# Patient Record
Sex: MALE | Race: WHITE | NOT HISPANIC OR LATINO | Employment: UNEMPLOYED
[De-identification: names, ages, dates, MRNs, and addresses within clinical notes are randomized per-mention and may not be internally consistent; named-entity substitution may affect disease eponyms.]

---

## 2020-02-08 ENCOUNTER — HEALTH MAINTENANCE LETTER (OUTPATIENT)
Age: 50
End: 2020-02-08

## 2020-11-07 ENCOUNTER — HEALTH MAINTENANCE LETTER (OUTPATIENT)
Age: 50
End: 2020-11-07

## 2021-03-27 ENCOUNTER — HEALTH MAINTENANCE LETTER (OUTPATIENT)
Age: 51
End: 2021-03-27

## 2021-09-11 ENCOUNTER — HEALTH MAINTENANCE LETTER (OUTPATIENT)
Age: 51
End: 2021-09-11

## 2022-02-16 ENCOUNTER — OFFICE VISIT (OUTPATIENT)
Dept: URGENT CARE | Facility: URGENT CARE | Age: 52
End: 2022-02-16
Payer: COMMERCIAL

## 2022-02-16 VITALS
OXYGEN SATURATION: 98 % | TEMPERATURE: 98.4 F | HEART RATE: 63 BPM | SYSTOLIC BLOOD PRESSURE: 123 MMHG | DIASTOLIC BLOOD PRESSURE: 72 MMHG

## 2022-02-16 DIAGNOSIS — N39.0 URINARY TRACT INFECTION WITHOUT HEMATURIA, SITE UNSPECIFIED: ICD-10-CM

## 2022-02-16 DIAGNOSIS — R30.0 DYSURIA: Primary | ICD-10-CM

## 2022-02-16 LAB
ALBUMIN UR-MCNC: 30 MG/DL
APPEARANCE UR: CLEAR
BACTERIA #/AREA URNS HPF: ABNORMAL /HPF
BILIRUB UR QL STRIP: NEGATIVE
COLOR UR AUTO: YELLOW
GLUCOSE UR STRIP-MCNC: NEGATIVE MG/DL
HGB UR QL STRIP: ABNORMAL
KETONES UR STRIP-MCNC: ABNORMAL MG/DL
LEUKOCYTE ESTERASE UR QL STRIP: ABNORMAL
NITRATE UR QL: POSITIVE
PH UR STRIP: 7.5 [PH] (ref 5–7)
RBC #/AREA URNS AUTO: ABNORMAL /HPF
SP GR UR STRIP: 1.02 (ref 1–1.03)
SQUAMOUS #/AREA URNS AUTO: ABNORMAL /LPF
UROBILINOGEN UR STRIP-ACNC: 0.2 E.U./DL
WBC #/AREA URNS AUTO: ABNORMAL /HPF
WBC CLUMPS #/AREA URNS HPF: PRESENT /HPF

## 2022-02-16 PROCEDURE — 87086 URINE CULTURE/COLONY COUNT: CPT | Performed by: FAMILY MEDICINE

## 2022-02-16 PROCEDURE — 99203 OFFICE O/P NEW LOW 30 MIN: CPT | Performed by: FAMILY MEDICINE

## 2022-02-16 PROCEDURE — 81001 URINALYSIS AUTO W/SCOPE: CPT

## 2022-02-16 PROCEDURE — 87088 URINE BACTERIA CULTURE: CPT | Performed by: FAMILY MEDICINE

## 2022-02-16 PROCEDURE — 87186 SC STD MICRODIL/AGAR DIL: CPT | Performed by: FAMILY MEDICINE

## 2022-02-16 RX ORDER — BUPRENORPHINE HYDROCHLORIDE AND NALOXONE HYDROCHLORIDE DIHYDRATE 2; .5 MG/1; MG/1
1 TABLET SUBLINGUAL 2 TIMES DAILY
COMMUNITY

## 2022-02-16 RX ORDER — SULFAMETHOXAZOLE/TRIMETHOPRIM 800-160 MG
1 TABLET ORAL 2 TIMES DAILY
Qty: 14 TABLET | Refills: 0 | Status: SHIPPED | OUTPATIENT
Start: 2022-02-16 | End: 2022-02-23

## 2022-02-16 NOTE — PROGRESS NOTES
SUBJECTIVE:   Israel Sanabria is a 51 year old male who  presents today for a possible UTI. Symptoms of dysuria, urgency, frequency and discharge have been going on for 3day(s).  Hematuria no.  sudden onset and worsening and moderate.  There is no history of fever, chills.  No concerns for STD, declined screen. This patient does not have a history of urinary tract infections.     Initially thought that he had a kidney stone but denies any significant pain.  BM normal, no rectal pain.    Past Medical History:   Diagnosis Date     Chronic pain     neck and shoulders     Depressive disorder      Substance abuse (H)      Current Outpatient Medications   Medication Sig Dispense Refill     buprenorphine-naloxone (SUBOXONE) 2-0.5 MG SUBL sublingual tablet Place 1 tablet under the tongue 2 times daily       cloNIDine (CATAPRES) 0.1 MG tablet Take 1 tablet (0.1 mg) by mouth 3 times daily for 7 days 21 tablet 0     Ketotifen Fumarate (EYE ITCH RELIEF OP) Apply to eye as needed (Patient not taking: Reported on 2022)       Social History     Tobacco Use     Smoking status: Former Smoker     Packs/day: 0.25     Quit date: 2014     Years since quittin.8     Smokeless tobacco: Never Used   Substance Use Topics     Alcohol use: No       ROS:   Review of systems negative except as stated above.    OBJECTIVE:  /72   Pulse 63   Temp 98.4  F (36.9  C) (Tympanic)   SpO2 98%   GENERAL APPEARANCE: healthy, alert and no distress  PSYCH: mentation appears normal and affect normal/bright    Results for orders placed or performed in visit on 22   UA reflex to Microscopic and Culture     Status: Abnormal    Specimen: Urine, Clean Catch   Result Value Ref Range    Color Urine Yellow Colorless, Straw, Light Yellow, Yellow    Appearance Urine Clear Clear    Glucose Urine Negative Negative mg/dL    Bilirubin Urine Negative Negative    Ketones Urine Trace (A) Negative mg/dL    Specific Gravity Urine 1.020 1.003 - 1.035     Blood Urine Trace (A) Negative    pH Urine 7.5 (H) 5.0 - 7.0    Protein Albumin Urine 30  (A) Negative mg/dL    Urobilinogen Urine 0.2 0.2, 1.0 E.U./dL    Nitrite Urine Positive (A) Negative    Leukocyte Esterase Urine Moderate (A) Negative   Urine Microscopic Exam     Status: Abnormal   Result Value Ref Range    Bacteria Urine Many (A) None Seen /HPF    RBC Urine 2-5 (A) 0-2 /HPF /HPF    WBC Urine 25-50 (A) 0-5 /HPF /HPF    Squamous Epithelials Urine Few (A) None Seen /LPF    WBC Clumps Urine Present (A) None Seen /HPF       ASSESSMENT/PLAN:   (R30.0) Dysuria  (primary encounter diagnosis)  Plan: UA reflex to Microscopic and Culture, Urine         Microscopic Exam, Urine Culture            (N39.0) Urinary tract infection without hematuria, site unspecified  Plan: sulfamethoxazole-trimethoprim (BACTRIM DS)         800-160 MG tablet            Empiric treatment for presumptive UTI with RX Bactrim DS, will follow up on urine culture and adjust medication if needed.  Drink plenty of fluids.  Signs and symptoms of pyelonephritis mentioned.  Reviewed that may need to have PCP evaluate prostate as potential etiology.    Follow up with PCP in 1-2 weeks if no resolution of symptoms.    Kvng Son MD  February 16, 2022 2:42 PM

## 2022-02-16 NOTE — LETTER
February 16, 2022      Israel Sanabria  [address]  [place] [address]        To Whom It May Concern:    Israel Sanabria  was seen on 2/16.  Please excuse him from work 2/16 due to illness.        Sincerely,        Kvng Son MD

## 2022-02-18 LAB — BACTERIA UR CULT: ABNORMAL

## 2022-03-06 ENCOUNTER — OFFICE VISIT (OUTPATIENT)
Dept: URGENT CARE | Facility: URGENT CARE | Age: 52
End: 2022-03-06
Payer: COMMERCIAL

## 2022-03-06 VITALS
OXYGEN SATURATION: 99 % | DIASTOLIC BLOOD PRESSURE: 87 MMHG | TEMPERATURE: 98.3 F | BODY MASS INDEX: 24.8 KG/M2 | RESPIRATION RATE: 17 BRPM | WEIGHT: 177.8 LBS | SYSTOLIC BLOOD PRESSURE: 127 MMHG | HEART RATE: 66 BPM

## 2022-03-06 DIAGNOSIS — N39.0 URINARY TRACT INFECTION WITHOUT HEMATURIA, SITE UNSPECIFIED: Primary | ICD-10-CM

## 2022-03-06 DIAGNOSIS — R36.9 PENILE DISCHARGE: ICD-10-CM

## 2022-03-06 DIAGNOSIS — R39.198 DECREASED URINE STREAM: ICD-10-CM

## 2022-03-06 LAB
ALBUMIN UR-MCNC: ABNORMAL MG/DL
APPEARANCE UR: ABNORMAL
BACTERIA #/AREA URNS HPF: ABNORMAL /HPF
BASOPHILS # BLD AUTO: 0 10E3/UL (ref 0–0.2)
BASOPHILS NFR BLD AUTO: 1 %
BILIRUB UR QL STRIP: NEGATIVE
COLOR UR AUTO: YELLOW
EOSINOPHIL # BLD AUTO: 0.2 10E3/UL (ref 0–0.7)
EOSINOPHIL NFR BLD AUTO: 3 %
GLUCOSE UR STRIP-MCNC: NEGATIVE MG/DL
HGB UR QL STRIP: ABNORMAL
KETONES UR STRIP-MCNC: NEGATIVE MG/DL
LEUKOCYTE ESTERASE UR QL STRIP: ABNORMAL
LYMPHOCYTES # BLD AUTO: 2.1 10E3/UL (ref 0.8–5.3)
LYMPHOCYTES NFR BLD AUTO: 27 %
MONOCYTES # BLD AUTO: 0.7 10E3/UL (ref 0–1.3)
MONOCYTES NFR BLD AUTO: 9 %
NEUTROPHILS # BLD AUTO: 4.6 10E3/UL (ref 1.6–8.3)
NEUTROPHILS NFR BLD AUTO: 61 %
NITRATE UR QL: POSITIVE
PH UR STRIP: 7 [PH] (ref 5–7)
RBC #/AREA URNS AUTO: ABNORMAL /HPF
SP GR UR STRIP: 1.02 (ref 1–1.03)
UROBILINOGEN UR STRIP-ACNC: 0.2 E.U./DL
WBC # BLD AUTO: 7.7 10E3/UL (ref 4–11)
WBC #/AREA URNS AUTO: ABNORMAL /HPF

## 2022-03-06 PROCEDURE — G0103 PSA SCREENING: HCPCS | Performed by: PHYSICIAN ASSISTANT

## 2022-03-06 PROCEDURE — 81001 URINALYSIS AUTO W/SCOPE: CPT | Performed by: PHYSICIAN ASSISTANT

## 2022-03-06 PROCEDURE — 87088 URINE BACTERIA CULTURE: CPT | Performed by: PHYSICIAN ASSISTANT

## 2022-03-06 PROCEDURE — 87491 CHLMYD TRACH DNA AMP PROBE: CPT | Performed by: PHYSICIAN ASSISTANT

## 2022-03-06 PROCEDURE — 87186 SC STD MICRODIL/AGAR DIL: CPT | Performed by: PHYSICIAN ASSISTANT

## 2022-03-06 PROCEDURE — 85004 AUTOMATED DIFF WBC COUNT: CPT | Performed by: PHYSICIAN ASSISTANT

## 2022-03-06 PROCEDURE — 36415 COLL VENOUS BLD VENIPUNCTURE: CPT | Performed by: PHYSICIAN ASSISTANT

## 2022-03-06 PROCEDURE — 87086 URINE CULTURE/COLONY COUNT: CPT | Performed by: PHYSICIAN ASSISTANT

## 2022-03-06 PROCEDURE — 85048 AUTOMATED LEUKOCYTE COUNT: CPT | Performed by: PHYSICIAN ASSISTANT

## 2022-03-06 PROCEDURE — 99213 OFFICE O/P EST LOW 20 MIN: CPT | Performed by: PHYSICIAN ASSISTANT

## 2022-03-06 PROCEDURE — 87591 N.GONORRHOEAE DNA AMP PROB: CPT | Performed by: PHYSICIAN ASSISTANT

## 2022-03-06 RX ORDER — SULFAMETHOXAZOLE/TRIMETHOPRIM 800-160 MG
1 TABLET ORAL 2 TIMES DAILY
Qty: 14 TABLET | Refills: 0 | Status: SHIPPED | OUTPATIENT
Start: 2022-03-06 | End: 2022-03-13

## 2022-03-06 NOTE — PROGRESS NOTES
SUBJECTIVE:   Israel Sanabria is a 51 year old male who  presents today for a possible UTI. Symptoms of dysuria, frequency, burning and suprapubic pain and pressure have been going on for 2day(s).  Hematuria no.  gradual onsetand moderate.  There is no history of fever, chills, nausea or vomiting.  Does have some penile discharge he states but no risk for STD. This patient does  have a history of urinary tract infections and last treated about 3 weeks ago.  Sx resolved but came back again. Patient denies long duration, rigors, flank pain, temperature > 101 degrees F., Vomiting, significant nausea or diarrhea, taking Coumadin and GFR less than 30 within the last year  Also worried about his PSA and would like checked     Past Medical History:   Diagnosis Date     Chronic pain     neck and shoulders     Depressive disorder      Substance abuse (H)      Current Outpatient Medications   Medication Sig Dispense Refill     buprenorphine-naloxone (SUBOXONE) 2-0.5 MG SUBL sublingual tablet Place 1 tablet under the tongue 2 times daily       Ketotifen Fumarate (EYE ITCH RELIEF OP) Apply to eye as needed        cloNIDine (CATAPRES) 0.1 MG tablet Take 1 tablet (0.1 mg) by mouth 3 times daily for 7 days 21 tablet 0     Social History     Tobacco Use     Smoking status: Former Smoker     Packs/day: 0.25     Quit date: 2014     Years since quittin.8     Smokeless tobacco: Never Used   Substance Use Topics     Alcohol use: No       ROS:   Review of systems negative except as stated above.    OBJECTIVE:  /87   Pulse 66   Temp 98.3  F (36.8  C)   Resp 17   Wt 80.6 kg (177 lb 12.8 oz)   SpO2 99%   BMI 24.80 kg/m    GENERAL APPEARANCE: healthy, alert and no distress  RESP: lungs clear to auscultation - no rales, rhonchi or wheezes  CV: regular rates and rhythm, normal S1 S2, no murmur noted  ABDOMEN:  soft, nontender, no HSM or masses and bowel sounds normal  BACK: No CVA tenderness  GU_male: declines   SKIN: no  suspicious lesions or rashes    Results for orders placed or performed in visit on 03/06/22   UA macro with reflex to Microscopic and Culture - Clinc Collect     Status: Abnormal    Specimen: Urine, Clean Catch   Result Value Ref Range    Color Urine Yellow Colorless, Straw, Light Yellow, Yellow    Appearance Urine Slightly Cloudy (A) Clear    Glucose Urine Negative Negative mg/dL    Bilirubin Urine Negative Negative    Ketones Urine Negative Negative mg/dL    Specific Gravity Urine 1.020 1.003 - 1.035    Blood Urine Moderate (A) Negative    pH Urine 7.0 5.0 - 7.0    Protein Albumin Urine Trace (A) Negative mg/dL    Urobilinogen Urine 0.2 0.2, 1.0 E.U./dL    Nitrite Urine Positive (A) Negative    Leukocyte Esterase Urine Moderate (A) Negative   Urine Microscopic Exam     Status: Abnormal   Result Value Ref Range    Bacteria Urine Many (A) None Seen /HPF    RBC Urine 10-25 (A) 0-2 /HPF /HPF    WBC Urine 25-50 (A) 0-5 /HPF /HPF   PSA, screen     Status: Normal   Result Value Ref Range    Prostate Specific Antigen Screen 0.79 0.00 - 4.00 ug/L   WBC and Differential     Status: None   Result Value Ref Range    WBC Count 7.7 4.0 - 11.0 10e3/uL    % Neutrophils 61 %    % Lymphocytes 27 %    % Monocytes 9 %    % Eosinophils 3 %    % Basophils 1 %    Absolute Neutrophils 4.6 1.6 - 8.3 10e3/uL    Absolute Lymphocytes 2.1 0.8 - 5.3 10e3/uL    Absolute Monocytes 0.7 0.0 - 1.3 10e3/uL    Absolute Eosinophils 0.2 0.0 - 0.7 10e3/uL    Absolute Basophils 0.0 0.0 - 0.2 10e3/uL   Neisseria gonorrhoeae PCR - Clinic Collect     Status: Normal    Specimen: Urine, Voided   Result Value Ref Range    Neisseria gonorrhoeae Negative Negative   Chlamydia trachomatis PCR - Clinic Collect     Status: Normal    Specimen: Urine, Voided   Result Value Ref Range    Chlamydia trachomatis Negative Negative   Urine Culture     Status: Abnormal    Specimen: Urine, Clean Catch   Result Value Ref Range    Culture >100,000 CFU/mL Staphylococcus aureus  (A)        Susceptibility    Staphylococcus aureus - VALERIE     Oxacillin* 0.5 Susceptible ug/mL      * Oxacillin susceptible isolates are susceptible to cephalosporins (example: cefazolin and cephalexin) and beta lactam combination agents. Oxacillin resistant isolates are resistant to these agents.     Gentamicin <=0.5 Susceptible ug/mL     Vancomycin 1.0 Susceptible ug/mL     Tetracycline <=1.0 Susceptible ug/mL     Nitrofurantoin <=16.0 Susceptible ug/mL     Trimethoprim/Sulfamethoxazole <=0.5/9.5 Susceptible ug/mL   WBC with Diff     Status: None    Narrative    The following orders were created for panel order WBC with Diff.  Procedure                               Abnormality         Status                     ---------                               -----------         ------                     WBC and Differential[[id number]]                             Final result                 Please view results for these tests on the individual orders.         ASSESSMENT:   Lower, uncomplicated urinary tract infection.    PLAN:    Culture pending  Bactrim as directed   Drink plenty of fluids.  Prevention and treatment of UTI's discussed.Signs and symptoms of pyelonephritis mentioned.  Follow up with primary care physician if not improving

## 2022-03-07 LAB — PSA SERPL-MCNC: 0.79 UG/L (ref 0–4)

## 2022-03-08 LAB
C TRACH DNA SPEC QL NAA+PROBE: NEGATIVE
N GONORRHOEA DNA SPEC QL NAA+PROBE: NEGATIVE

## 2022-03-09 LAB — BACTERIA UR CULT: ABNORMAL

## 2022-04-17 ENCOUNTER — HEALTH MAINTENANCE LETTER (OUTPATIENT)
Age: 52
End: 2022-04-17

## 2022-10-29 ENCOUNTER — HEALTH MAINTENANCE LETTER (OUTPATIENT)
Age: 52
End: 2022-10-29

## 2023-04-02 ENCOUNTER — OFFICE VISIT (OUTPATIENT)
Dept: FAMILY MEDICINE | Facility: CLINIC | Age: 53
End: 2023-04-02
Payer: COMMERCIAL

## 2023-04-02 VITALS
HEART RATE: 73 BPM | OXYGEN SATURATION: 97 % | DIASTOLIC BLOOD PRESSURE: 86 MMHG | TEMPERATURE: 98.4 F | SYSTOLIC BLOOD PRESSURE: 148 MMHG

## 2023-04-02 DIAGNOSIS — K04.7 TOOTH INFECTION: Primary | ICD-10-CM

## 2023-04-02 PROCEDURE — 99213 OFFICE O/P EST LOW 20 MIN: CPT | Performed by: FAMILY MEDICINE

## 2023-04-02 RX ORDER — CLINDAMYCIN HCL 300 MG
300 CAPSULE ORAL 4 TIMES DAILY
Qty: 30 CAPSULE | Refills: 0 | Status: SHIPPED | OUTPATIENT
Start: 2023-04-02

## 2023-04-02 NOTE — PROGRESS NOTES
SUBJECTIVE:   Israel Sanabria is a 52 year old male presenting with a chief complaint of tooth pain  Tooth has a crown on it- pain started a couple of days. Patient reports a funny taste in his mouth. Has had no systemic symptoms.   Chief Complaint   Patient presents with     Dental Pain     Tooth pain started about 2-3 days ago progressively worse.        He is an established patient of [facility].    Dental pain    Onset of symptoms was 3 day(s) ago.  Course of illness is worsening.    Severity moderate  Current and Associated symptoms: swollen gums, pain in the tooth  Treatment measures tried include Tylenol/Ibuprofen.  Predisposing factors include None.                Review of Systems   Constitutional: Negative.    HENT: Positive for dental problem.    Eyes: Negative.    Respiratory: Negative.    Cardiovascular: Negative.    Gastrointestinal: Negative.    Endocrine: Negative.    Genitourinary: Negative.    Musculoskeletal: Negative.    Skin: Negative.    Allergic/Immunologic: Negative.    Neurological: Negative.    Hematological: Negative.    Psychiatric/Behavioral: Negative.        Past Medical History:   Diagnosis Date     Chronic pain     neck and shoulders     Depressive disorder      Substance abuse (H)      Family History   Problem Relation Age of Onset     C.A.D. Mother         not diagnosed     Heart Disease Mother         not diagnosed     C.A.D. Paternal Grandmother      Heart Disease Paternal Grandmother      Hypertension Mother      Cerebrovascular Disease Mother      Cerebrovascular Disease Paternal Grandmother      Alzheimer Disease Paternal Grandmother      Alzheimer Disease Paternal Grandfather      Arthritis Paternal Grandmother      Circulatory Paternal Grandmother      Lipids Mother         not diagnosed     Lipids Father      Musculoskeletal Disorder Father      Musculoskeletal Disorder Sister      Obesity Mother      Current Outpatient Medications   Medication Sig Dispense Refill      buprenorphine-naloxone (SUBOXONE) 2-0.5 MG SUBL sublingual tablet Place 1 tablet under the tongue 2 times daily       cloNIDine (CATAPRES) 0.1 MG tablet Take 1 tablet (0.1 mg) by mouth 3 times daily for 7 days 21 tablet 0     Ketotifen Fumarate (EYE ITCH RELIEF OP) Apply to eye as needed  (Patient not taking: Reported on 2023)       Social History     Tobacco Use     Smoking status: Former     Packs/day: 0.25     Types: Cigarettes     Quit date: 2014     Years since quittin.9     Smokeless tobacco: Never   Vaping Use     Vaping status: Not on file   Substance Use Topics     Alcohol use: No       OBJECTIVE  BP (!) 148/86   Pulse 73   Temp 98.4  F (36.9  C)   SpO2 97%     Physical Exam  Constitutional:       Appearance: Normal appearance.   HENT:      Mouth/Throat:      Dentition: Abnormal dentition. Dental tenderness, gingival swelling, dental caries and dental abscesses present.        Comments: Lower left molar  Cardiovascular:      Rate and Rhythm: Normal rate and regular rhythm.      Pulses: Normal pulses.      Heart sounds: Normal heart sounds.   Pulmonary:      Effort: Pulmonary effort is normal. No respiratory distress.      Breath sounds: Normal breath sounds. No stridor. No wheezing, rhonchi or rales.   Chest:      Chest wall: No tenderness.   Neurological:      Mental Status: He is alert.         Labs:  No results found for this or any previous visit (from the past 24 hour(s)).    X-Ray was not done.    ASSESSMENT:      ICD-10-CM    1. Tooth infection  K04.7 clindamycin (CLEOCIN) 300 MG capsule           Medical Decision Making:        Serious Comorbid Conditions:  Adult:  None    PLAN:    Dental , Adult:  Tylenol, Ibuprofen, Fluids and Rest    Followup:    If not improving or if condition worsens, follow up with your Primary Care Provider    There are no Patient Instructions on file for this visit.

## 2023-04-04 ASSESSMENT — ENCOUNTER SYMPTOMS
GASTROINTESTINAL NEGATIVE: 1
EYES NEGATIVE: 1
ALLERGIC/IMMUNOLOGIC NEGATIVE: 1
PSYCHIATRIC NEGATIVE: 1
ENDOCRINE NEGATIVE: 1
RESPIRATORY NEGATIVE: 1
NEUROLOGICAL NEGATIVE: 1
HEMATOLOGIC/LYMPHATIC NEGATIVE: 1
CONSTITUTIONAL NEGATIVE: 1
CARDIOVASCULAR NEGATIVE: 1
MUSCULOSKELETAL NEGATIVE: 1

## 2023-06-01 ENCOUNTER — HEALTH MAINTENANCE LETTER (OUTPATIENT)
Age: 53
End: 2023-06-01

## 2023-06-16 ENCOUNTER — OFFICE VISIT (OUTPATIENT)
Dept: FAMILY MEDICINE | Facility: CLINIC | Age: 53
End: 2023-06-16
Payer: COMMERCIAL

## 2023-06-16 VITALS
WEIGHT: 176 LBS | HEART RATE: 74 BPM | RESPIRATION RATE: 16 BRPM | OXYGEN SATURATION: 98 % | BODY MASS INDEX: 24.55 KG/M2 | DIASTOLIC BLOOD PRESSURE: 74 MMHG | TEMPERATURE: 98.3 F | SYSTOLIC BLOOD PRESSURE: 109 MMHG

## 2023-06-16 DIAGNOSIS — K08.89 PAIN, DENTAL: Primary | ICD-10-CM

## 2023-06-16 PROCEDURE — 99213 OFFICE O/P EST LOW 20 MIN: CPT | Performed by: NURSE PRACTITIONER

## 2023-06-16 RX ORDER — CHLORHEXIDINE GLUCONATE ORAL RINSE 1.2 MG/ML
SOLUTION DENTAL
Qty: 473 ML | Refills: 0 | Status: SHIPPED | OUTPATIENT
Start: 2023-06-16

## 2023-06-16 ASSESSMENT — ENCOUNTER SYMPTOMS
CHILLS: 0
FEVER: 0

## 2023-06-17 NOTE — PATIENT INSTRUCTIONS
Ibuprofen as needed.  Recommend 600 mg 3-4 times daily.    Try chlorhexidine rinse -teeth in the morning and then to rinse 2 additional times daily.  Do this until your symptoms resolve.    Come back with facial swelling or fevers.    It is not good to be on clindamycin frequently.  You last had clindamycin in April.    See a dentist as soon as possible.

## 2023-06-17 NOTE — PROGRESS NOTES
Assessment & Plan     Pain, dental    - chlorhexidine (PERIDEX) 0.12 % solution  Dispense: 473 mL; Refill: 0    Patient with dental pain with chewing for the last 2 to 3 days.  No obvious tenderness on exam.  He does have a little gum recession in the area.  No obvious visible abscess seen.  No facial swelling.  Has recently been on clindamycin last time in April for dental pain.    We will try chlorhexidine rinses twice daily until symptoms resolve. ibuprofen as needed.    Offered emergency dental clinic referral.  Patient states they do not take his insurance.  Says he does have private insurance and cannot find a dentist easily.  Does not want any extractions.    Urged him to keep trying to find a dentist.  Recheck if facial swelling, fevers, worse.    Angry with plan and would like clindamycin.  Explained drug-resistant infection or C. Difficile not appropriate to be on it every 2 months and there are more causes to dental pain than simply infections.  Explained antibiotic rinse can help to prevent dental infections and reduce dental pain.              Return for If no better.    Silvina Jorge, [facility]    Eloise Wood is a 52 year old male who presents to clinic today for the following health issues:  Chief Complaint   Patient presents with     Dental Problem     Lower Lt back tooth x 2-3 day. Pain when chew.     HPI    Dental pain lower left lower tooth with chewing.  States he had a infection in this tooth previously.  Has a history of amoxicillin reaction.  Cannot find a dentist.  Was on clindamycin back in April.        Review of Systems   Constitutional: Negative for chills and fever.           Objective    /74   Pulse 74   Temp 98.3  F (36.8  C) (Oral)   Resp 16   Wt 79.8 kg (176 lb)   SpO2 98%   BMI 24.55 kg/m    Physical Exam  Constitutional:       Appearance: He is well-developed. He is not ill-appearing.   HENT:      Mouth/Throat:      Dentition:  Dental caries present. No dental abscesses or gum lesions.        Comments: Painful tooth.  Gum recession.  Nontender.  Eyes:      General:         Right eye: No discharge.         Left eye: No discharge.      Conjunctiva/sclera: Conjunctivae normal.   Pulmonary:      Effort: Pulmonary effort is normal.   Musculoskeletal:         General: Normal range of motion.   Skin:     General: Skin is warm.   Neurological:      Mental Status: He is alert and oriented to person, place, and time.   Psychiatric:         Mood and Affect: Mood normal.         Behavior: Behavior normal.         Thought Content: Thought content normal.         Judgment: Judgment normal.

## 2023-07-14 ENCOUNTER — ANCILLARY PROCEDURE (OUTPATIENT)
Dept: GENERAL RADIOLOGY | Facility: CLINIC | Age: 53
End: 2023-07-14
Attending: PHYSICIAN ASSISTANT
Payer: COMMERCIAL

## 2023-07-14 ENCOUNTER — OFFICE VISIT (OUTPATIENT)
Dept: URGENT CARE | Facility: URGENT CARE | Age: 53
End: 2023-07-14
Payer: COMMERCIAL

## 2023-07-14 VITALS
RESPIRATION RATE: 20 BRPM | DIASTOLIC BLOOD PRESSURE: 85 MMHG | HEART RATE: 74 BPM | TEMPERATURE: 98 F | OXYGEN SATURATION: 98 % | SYSTOLIC BLOOD PRESSURE: 135 MMHG

## 2023-07-14 DIAGNOSIS — S62.651A NONDISPLACED FRACTURE OF MIDDLE PHALANX OF LEFT INDEX FINGER, INITIAL ENCOUNTER FOR CLOSED FRACTURE: ICD-10-CM

## 2023-07-14 DIAGNOSIS — M79.645 PAIN IN FINGER OF BOTH HANDS: ICD-10-CM

## 2023-07-14 DIAGNOSIS — M79.644 PAIN IN FINGER OF BOTH HANDS: ICD-10-CM

## 2023-07-14 DIAGNOSIS — S60.051A CONTUSION OF RIGHT LITTLE FINGER WITHOUT DAMAGE TO NAIL, INITIAL ENCOUNTER: Primary | ICD-10-CM

## 2023-07-14 PROCEDURE — 99213 OFFICE O/P EST LOW 20 MIN: CPT | Performed by: PHYSICIAN ASSISTANT

## 2023-07-14 PROCEDURE — 73140 X-RAY EXAM OF FINGER(S): CPT | Mod: TC | Performed by: RADIOLOGY

## 2023-07-14 NOTE — PATIENT INSTRUCTIONS
Wear the splint   It takes 4-6 weeks for the fracture to heal  Take ibuprofen for pain  Follow-up with PCP or orthopedics in 2 weeks for a recheck

## 2023-07-14 NOTE — PROGRESS NOTES
Assessment & Plan     1. Contusion of right little finger without damage to nail, initial encounter  - XR Finger Right G/E 2 Views; Future  - XR Finger Left G/E 2 Views; Future    2. Nondisplaced fracture of middle phalanx of left index finger, initial encounter for closed fracture  Nondisplaced avulsion fracture noted at the left index finger.  Patient is neurovascularly intact.  Advised RICE therapy, including (rest, ice, compression, elevation)   Over-the-counter analgesics (Tylenol or Ibuprofen) as needed.   Follow-up with Orthopedics / sports medicine or PCP in two weeks if symptoms worsen or do not improve.   Seek emergency care if you develop severe pain/swelling, inability to move extremity, numbness / tingling, weakness, skin paleness, or icy cold extremity.          Return in about 2 weeks (around 7/28/2023) for PCP.    Diagnosis and treatment plan was reviewed with patient and/or family.   We went over any labs or imaging. Discussed worsening symptoms or little to no relief despite treatment plan to follow-up with PCP or return to clinic.  Patient verbalizes understanding. All questions were addressed and answered.     Latoya Dumas PA-C  [facility]    CHIEF COMPLAINT:   Chief Complaint   Patient presents with     Urgent Care     Fingers pain no injury      Subjective     Israel is a 52 year old male who presents to clinic today for evaluation of finger pain.  Yesterday, when patient was at work, he was trying to stop down some cardboard, and his finger with a steel shoot.  He now has pain in his index finger, along with right pinky finger.    Patient denies having fever, chills, numbness, tingling, pale or cold extremity.       Past Medical History:   Diagnosis Date     Chronic pain     neck and shoulders     Depressive disorder      Substance abuse (H)      Past Surgical History:   Procedure Laterality Date     HERNIA REPAIR, FEMORAL RT/LT  2000     LAPAROSCOPIC  APPENDECTOMY  2013    Procedure: LAPAROSCOPIC APPENDECTOMY;  LAPAROSCOPIC APPENDECTOMY;  Surgeon: Belkis Ireland MD;  Location:  OR     [facility] NONSPECIFIC PROCEDURE  age 11    tubes in ears     [facility] NONSPECIFIC PROCEDURE      dental surgery     Social History     Tobacco Use     Smoking status: Former     Packs/day: 0.25     Types: Cigarettes     Quit date: 2014     Years since quittin.2     Smokeless tobacco: Never   Substance Use Topics     Alcohol use: No     Current Outpatient Medications   Medication     buprenorphine-naloxone (SUBOXONE) 2-0.5 MG SUBL sublingual tablet     chlorhexidine (PERIDEX) 0.12 % solution     clindamycin (CLEOCIN) 300 MG capsule     cloNIDine (CATAPRES) 0.1 MG tablet     Ketotifen Fumarate (EYE ITCH RELIEF OP)     No current facility-administered medications for this visit.     Allergies   Allergen Reactions     Amoxicillin Hives and Itching     Kiwi Hives and Itching     Unsure if still allergic     Tramadol      Seizure         10 point ROS of systems were all negative except for pertinent positives noted in my HPI.      Exam:   /85   Pulse 74   Temp 98  F (36.7  C)   Resp 20   SpO2 98%   Gen: healthy,alert,no distress  Extremity: 1) Left index finger has swelling and erythema with decreased ROM with flexion. 2) Right pinky finger has TTP. FROM   There is not compromise to the distal circulation.  Pulses are +2 and CRT is brisk  EXTREMITIES: peripheral pulses normal  SKIN: no suspicious lesions or rashes  NEURO: Normal strength and tone, sensory exam grossly normal, mentation intact and speech normal    Results for orders placed or performed in visit on 23   XR Finger Left G/E 2 Views     Status: None    Narrative    EXAM: XR FINGER LEFT G/E 2 VIEWS  DATE/TIME: 2023 4:29 PM     INDICATION: Left finger pain.   COMPARISON: None.      Impression    IMPRESSION:  1.  Tiny avulsive fracture at the ulnar margin of the second middle  phalanx base.  2.   Normal joint spacing and alignment.  3.  Focal soft tissue swelling at the dorsal margin of the second PIP  joint.  4.  Probable os styloideum.    JUAN JOSE GUZMAN MD         SYSTEM ID:  [id number]   Results for orders placed or performed in visit on 07/14/23   XR Finger Right G/E 2 Views     Status: None    Narrative    EXAM: XR FINGER RIGHT G/E 2 VIEWS  DATE/TIME: 7/14/2023 4:29 PM     INDICATION: Right fifth finger pain after an injury.   COMPARISON: None.      Impression    IMPRESSION:  1.  Normal joint space and alignment.  2.  No fracture.  3.  Soft tissue swelling at the dorsal margin of the fifth PIP joint.    JUAN JOSE GUZMAN MD         SYSTEM ID:  [id number]

## 2023-08-12 ENCOUNTER — ANCILLARY PROCEDURE (OUTPATIENT)
Dept: GENERAL RADIOLOGY | Facility: CLINIC | Age: 53
End: 2023-08-12
Attending: FAMILY MEDICINE
Payer: COMMERCIAL

## 2023-08-12 ENCOUNTER — OFFICE VISIT (OUTPATIENT)
Dept: URGENT CARE | Facility: URGENT CARE | Age: 53
End: 2023-08-12
Payer: COMMERCIAL

## 2023-08-12 VITALS
OXYGEN SATURATION: 98 % | WEIGHT: 160 LBS | DIASTOLIC BLOOD PRESSURE: 79 MMHG | RESPIRATION RATE: 16 BRPM | BODY MASS INDEX: 22.32 KG/M2 | TEMPERATURE: 98.1 F | SYSTOLIC BLOOD PRESSURE: 124 MMHG | HEART RATE: 62 BPM

## 2023-08-12 DIAGNOSIS — S62.621S: Primary | ICD-10-CM

## 2023-08-12 DIAGNOSIS — S61.301A AVULSION OF NAIL OF LEFT INDEX FINGER: ICD-10-CM

## 2023-08-12 PROCEDURE — 99213 OFFICE O/P EST LOW 20 MIN: CPT | Performed by: FAMILY MEDICINE

## 2023-08-12 PROCEDURE — 73140 X-RAY EXAM OF FINGER(S): CPT | Mod: TC | Performed by: RADIOLOGY

## 2023-08-12 NOTE — PATIENT INSTRUCTIONS
Stop wearing the finger splint.      Follow up with the orthopedic specialist for further evaluation and treatment of the left index finger swelling and for the problems in bending the left index finger.      Place ice over the left index finger.      Continue bending the left index finger throughout the day.

## 2023-08-12 NOTE — PROGRESS NOTES
SUBJECTIVE:   Israel Sanabria is a 52 year old male with a history who was diagnosed at the [facility] on 2023, with an avulsion at the ulnar margin of the base of the middle phalanx bone of the left index finger.  The injury occurred on 2023.  He has been wearing a finger splint for the past month.  Patient is presenting with a chief complaint of difficulty of bending the PIP joint and at the DIP joint.  He was told to wear the foam finger splint for 2 weeks; however, patient decided to wear the splint for a total of 4 weeks.  There has been persistent swelling and some bruising at the PIP joint.  Patient has had difficulty playing guitar with the finger. Due to the lack of ROM at the left index finger.     Patient has worn the finger splint for the past month, especially at night and patient has taped the finger when at work.        Past Medical History:   Diagnosis Date    Chronic pain     neck and shoulders    Depressive disorder     Substance abuse (H)      Current Outpatient Medications   Medication Sig Dispense Refill    buprenorphine-naloxone (SUBOXONE) 2-0.5 MG SUBL sublingual tablet Place 1 tablet under the tongue 2 times daily      chlorhexidine (PERIDEX) 0.12 % solution Swish for 30 seconds with 15 mL (one capful) of undiluted oral rinse after toothbrushing, then spit until symptoms resolve twice daily . 473 mL 0    clindamycin (CLEOCIN) 300 MG capsule Take 1 capsule (300 mg) by mouth 4 times daily (Patient not taking: Reported on 2023) 30 capsule 0    cloNIDine (CATAPRES) 0.1 MG tablet Take 1 tablet (0.1 mg) by mouth 3 times daily for 7 days 21 tablet 0    Ketotifen Fumarate (EYE ITCH RELIEF OP) Apply to eye as needed  (Patient not taking: Reported on 2023)       Social History     Tobacco Use    Smoking status: Former     Packs/day: 0.25     Types: Cigarettes     Quit date: 2014     Years since quittin.2    Smokeless tobacco: Never    Substance Use Topics    Alcohol use: No       ROS:  CONSTITUTIONAL:NEGATIVE for fever, chills, change in weight  MUSCULOSKELETAL:  positive for finger swelling and stiffness.      OBJECTIVE:  /79   Pulse 62   Temp 98.1  F (36.7  C)   Resp 16   Wt 72.6 kg (160 lb)   SpO2 98%   BMI 22.32 kg/m    GENERAL APPEARANCE: healthy, alert and no distress  Extremities: The entire left index finger has mild edema without increased erythema.  No ecchymosis.  No new masses.  No tenderness to palpation.  There decreased flexion at the PIP joint.      X-rays of the left index finger: I viewed all X-ray images during this clinic encounter.  The X-rays of the left index finger showed no more avulsion fracture at the ulnar margin of the base of the middle phalanx (compared to the July 14, 2023, X-rays).        ASSESSMENT:  Left Index finger avulsion at the ulnar margin of the middle phalanx.  Today's X-rays show that the lesion has healed .  Patient's decreased ROM at the left PIP joint may be due to scar tissue at that joint due to prolonged immobility of the finger in the splint.      PLAN:  Stop wearing the finger splint.      Follow up with the orthopedic specialist for further evaluation and treatment of the left index finger swelling and for the problems in bending the left index finger.  I ordered an orthopedic  referral for the patient.      Place ice over the left index finger.      Continue bending the left index finger throughout the day.        Nikos Schumacher MD

## 2023-08-15 ENCOUNTER — TELEPHONE (OUTPATIENT)
Dept: ORTHOPEDICS | Facility: CLINIC | Age: 53
End: 2023-08-15
Payer: COMMERCIAL

## 2023-08-16 DIAGNOSIS — M79.645 FINGER PAIN, LEFT: Primary | ICD-10-CM

## 2023-08-16 NOTE — PROGRESS NOTES
"[facility]  Sports Medicine Clinic  [facility]           SUBJECTIVE       Israel Sanabria is a 52 year old male presenting to clinic today with left finger fx.    Background:   We did have an injury to the left index finger multiple years ago in 2013, which resulted in a bump on the dorsum of the hand.  Since that time he has not noticed whether his DIP joint has been able to move very freely.  Recently however he did have an injury with an avulsion fracture has been managed externally, but since that time he has noticed more stiffness in the joint without the ability to flex the finger.  The lesion on the dorsum of his finger has grown in size.  There is no redness or warmth.  There is no instability of the finger.  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  Recent x-rays at the urgent care have shown that the previous avulsion fracture has been healed, however they thought that there could be some scar tissue which is resulting in his inability to move the finger.    Prior Evaluation: 8/12/23, [facility]  \"ASSESSMENT:  Left Index finger avulsion at the ulnar margin of the middle phalanx.  Today's X-rays show that the lesion has healed .  Patient's decreased ROM at the left PIP joint may be due to scar tissue at that joint due to prolonged immobility of the finger in the splint.       PLAN:  Stop wearing the finger splint.       Follow up with the orthopedic specialist for further evaluation and treatment of the left index finger swelling and for the problems in bending the left index finger.  I ordered an orthopedic  referral for the patient.\"        PMH, Medications and Allergies were reviewed and updated as needed.    Study Result    Narrative & Impression   EXAM: XR FINGER LEFT G/E 2 VIEWS  LOCATION: [facility]  DATE: 8/12/2023     INDICATION: Patient was diagnosed with an avulsive fracture at the ulnar margin of the base of " the left second middle phalanx bone on July 14, 2023.  Patient complains of continued edema and decreased flexion at the PIP joint..  COMPARISON: None.                                                                      IMPRESSION: Normal joint spaces and alignment. No fracture.          ROS:  As noted above otherwise negative.    Patient Active Problem List   Diagnosis    Recurrent major depression in partial remission (H)    Recurrent upper back pain    Myofascial pain    Chronic pain    Lateral epicondylitis (tennis elbow)    CARDIOVASCULAR SCREENING; LDL GOAL LESS THAN 160    Seizure disorder (H)    Pain in joint, shoulder region    S/P appy       Current Outpatient Medications   Medication Sig Dispense Refill    buprenorphine-naloxone (SUBOXONE) 2-0.5 MG SUBL sublingual tablet Place 1 tablet under the tongue 2 times daily      chlorhexidine (PERIDEX) 0.12 % solution Swish for 30 seconds with 15 mL (one capful) of undiluted oral rinse after toothbrushing, then spit until symptoms resolve twice daily . 473 mL 0    clindamycin (CLEOCIN) 300 MG capsule Take 1 capsule (300 mg) by mouth 4 times daily (Patient not taking: Reported on 7/14/2023) 30 capsule 0    cloNIDine (CATAPRES) 0.1 MG tablet Take 1 tablet (0.1 mg) by mouth 3 times daily for 7 days 21 tablet 0    Ketotifen Fumarate (EYE ITCH RELIEF OP) Apply to eye as needed  (Patient not taking: Reported on 8/12/2023)              OBJECTIVE:       Vitals: There were no vitals filed for this visit.  BMI: There is no height or weight on file to calculate BMI.    Gen:  Well nourished and in no acute distress  HEENT: Extraocular movement intact  Neck: Supple  Pulm:  Breathing Comfortably. No increased respiratory effort.  Psych: Euthymic. Appropriately answers questions    MSK: Left index finger without evidence of overlying warmth or erythema.  Nodular appearance just proximal to the ulnar side first index finger, DIP joint.  When isolating the DIP joint, patient can  actively extend it, difficulty with flexing.  No tenderness to palpation throughout the finger.  No other abnormalities throughout the hand.  No joint line tenderness throughout the index finger at the PIP or DIP joint.            ASSESSMENT and PLAN:     Diagnoses and all orders for this visit:    Closed displaced fracture of middle phalanx of left index finger, sequela  -     Orthopedic  Referral  -     MR Hand Left w/o Contrast; Future      52-year-old male presenting to clinic today with left index finger acute on chronic pain, and inability to move.  The patient's pain pattern has subsided, but he has had a lesion on the dorsum of the finger since 2013 which has not grown in size.  He is not having any B symptoms such as night sweats, fevers, or weight loss.  The avulsion injury that is more recent has also healed on x-ray, however the patient is noticing an inability to flex that finger which is resulting in him having difficulty with playing his musical instrument.  Given this, we have ordered an MRI for evaluation of the lesion on the dorsum of the hand which feels most likely like a cystic lesion, as well as to evaluate any flexor tendon disruption to that could be leading to his inability to flex the DIP.  We will contact the patient via telephone or MyChart once the imaging has resulted.  No indication for further bracing or immobilization at this point, which I do think could have led to some of his restriction in motion with the prolonged splinting.    Options for treatment and/or follow-up care were reviewed with the patient was actively involved in the decision making process. Patient verbalized understanding and was in agreement with the plan.    Rupesh Handley DO  , Sports Medicine  Department of Family Medicine and [facility]

## 2023-08-16 NOTE — TELEPHONE ENCOUNTER
DIAGNOSIS: Closed displaced fracture of middle phalanx of left index finger, sequela    APPOINTMENT DATE: 08/18/23   NOTES STATUS DETAILS   OFFICE NOTE from referring provider Internal 08/12/23 - Nikos Schumacher MD -  Mari   OFFICE NOTE from other specialist Internal 07/14/23 - Latoya Dumas PA-C -  Mari        MEDICATION LIST Internal    XRAYS (IMAGES & REPORTS) Internal 08/12/23 - Nikos Schumacher MD -  Mari:  - XR L Finger    07/14/23 - Latoya Dumas - BUSHRA Guerra:  - XR L Finger

## 2023-08-18 ENCOUNTER — OFFICE VISIT (OUTPATIENT)
Dept: ORTHOPEDICS | Facility: CLINIC | Age: 53
End: 2023-08-18
Payer: COMMERCIAL

## 2023-08-18 ENCOUNTER — PRE VISIT (OUTPATIENT)
Dept: ORTHOPEDICS | Facility: CLINIC | Age: 53
End: 2023-08-18

## 2023-08-18 DIAGNOSIS — S62.621S: ICD-10-CM

## 2023-08-18 PROCEDURE — 99203 OFFICE O/P NEW LOW 30 MIN: CPT | Performed by: STUDENT IN AN ORGANIZED HEALTH CARE EDUCATION/TRAINING PROGRAM

## 2023-08-18 NOTE — LETTER
"  8/18/2023      RE: Israel Sanabria  [address] [place] [address]     Dear Colleague,    Thank you for referring your patient, Israel Sanabria, to the [facility]. Please see a copy of my visit note below.    [facility]  Sports Medicine Clinic  Clinics and Surgery Center           SUBJECTIVE       Israel Sanabria is a 52 year old male presenting to clinic today with left finger fx.    Background:   We did have an injury to the left index finger multiple years ago in 2013, which resulted in a bump on the dorsum of the hand.  Since that time he has not noticed whether his DIP joint has been able to move very freely.  Recently however he did have an injury with an avulsion fracture has been managed externally, but since that time he has noticed more stiffness in the joint without the ability to flex the finger.  The lesion on the dorsum of his finger has grown in size.  There is no redness or warmth.  There is no instability of the finger.  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  Recent x-rays at the urgent care have shown that the previous avulsion fracture has been healed, however they thought that there could be some scar tissue which is resulting in his inability to move the finger.    Prior Evaluation: 8/12/23, [facility]  \"ASSESSMENT:  Left Index finger avulsion at the ulnar margin of the middle phalanx.  Today's X-rays show that the lesion has healed .  Patient's decreased ROM at the left PIP joint may be due to scar tissue at that joint due to prolonged immobility of the finger in the splint.       PLAN:  Stop wearing the finger splint.       Follow up with the orthopedic specialist for further evaluation and treatment of the left index finger swelling and for the problems in bending the left index finger.  I ordered an orthopedic  referral for the patient.\"        PMH, Medications and Allergies were " reviewed and updated as needed.    Study Result    Narrative & Impression   EXAM: XR FINGER LEFT G/E 2 VIEWS  LOCATION: [facility]  DATE: 8/12/2023     INDICATION: Patient was diagnosed with an avulsive fracture at the ulnar margin of the base of the left second middle phalanx bone on July 14, 2023.  Patient complains of continued edema and decreased flexion at the PIP joint..  COMPARISON: None.                                                                      IMPRESSION: Normal joint spaces and alignment. No fracture.          ROS:  As noted above otherwise negative.    Patient Active Problem List   Diagnosis    Recurrent major depression in partial remission (H)    Recurrent upper back pain    Myofascial pain    Chronic pain    Lateral epicondylitis (tennis elbow)    CARDIOVASCULAR SCREENING; LDL GOAL LESS THAN 160    Seizure disorder (H)    Pain in joint, shoulder region    S/P appy       Current Outpatient Medications   Medication Sig Dispense Refill    buprenorphine-naloxone (SUBOXONE) 2-0.5 MG SUBL sublingual tablet Place 1 tablet under the tongue 2 times daily      chlorhexidine (PERIDEX) 0.12 % solution Swish for 30 seconds with 15 mL (one capful) of undiluted oral rinse after toothbrushing, then spit until symptoms resolve twice daily . 473 mL 0    clindamycin (CLEOCIN) 300 MG capsule Take 1 capsule (300 mg) by mouth 4 times daily (Patient not taking: Reported on 7/14/2023) 30 capsule 0    cloNIDine (CATAPRES) 0.1 MG tablet Take 1 tablet (0.1 mg) by mouth 3 times daily for 7 days 21 tablet 0    Ketotifen Fumarate (EYE ITCH RELIEF OP) Apply to eye as needed  (Patient not taking: Reported on 8/12/2023)              OBJECTIVE:       Vitals: There were no vitals filed for this visit.  BMI: There is no height or weight on file to calculate BMI.    Gen:  Well nourished and in no acute distress  HEENT: Extraocular movement intact  Neck: Supple  Pulm:  Breathing Comfortably. No increased  respiratory effort.  Psych: Euthymic. Appropriately answers questions    MSK: Left index finger without evidence of overlying warmth or erythema.  Nodular appearance just proximal to the ulnar side first index finger, DIP joint.  When isolating the DIP joint, patient can actively extend it, difficulty with flexing.  No tenderness to palpation throughout the finger.  No other abnormalities throughout the hand.  No joint line tenderness throughout the index finger at the PIP or DIP joint.            ASSESSMENT and PLAN:     Diagnoses and all orders for this visit:    Closed displaced fracture of middle phalanx of left index finger, sequela  -     Orthopedic  Referral  -     MR Hand Left w/o Contrast; Future      52-year-old male presenting to clinic today with left index finger acute on chronic pain, and inability to move.  The patient's pain pattern has subsided, but he has had a lesion on the dorsum of the finger since 2013 which has not grown in size.  He is not having any B symptoms such as night sweats, fevers, or weight loss.  The avulsion injury that is more recent has also healed on x-ray, however the patient is noticing an inability to flex that finger which is resulting in him having difficulty with playing his musical instrument.  Given this, we have ordered an MRI for evaluation of the lesion on the dorsum of the hand which feels most likely like a cystic lesion, as well as to evaluate any flexor tendon disruption to that could be leading to his inability to flex the DIP.  We will contact the patient via telephone or MyChart once the imaging has resulted.  No indication for further bracing or immobilization at this point, which I do think could have led to some of his restriction in motion with the prolonged splinting.    Options for treatment and/or follow-up care were reviewed with the patient was actively involved in the decision making process. Patient verbalized understanding and was in  agreement with the plan.    Rupesh Handley DO  , Sports Medicine  Department of Family Medicine and [facility]

## 2023-08-20 ENCOUNTER — HOSPITAL ENCOUNTER (OUTPATIENT)
Dept: MRI IMAGING | Facility: HOSPITAL | Age: 53
Discharge: HOME | End: 2023-08-20
Attending: STUDENT IN AN ORGANIZED HEALTH CARE EDUCATION/TRAINING PROGRAM | Admitting: STUDENT IN AN ORGANIZED HEALTH CARE EDUCATION/TRAINING PROGRAM
Payer: COMMERCIAL

## 2023-08-20 DIAGNOSIS — S62.621S: ICD-10-CM

## 2023-08-20 PROCEDURE — 73221 MRI JOINT UPR EXTREM W/O DYE: CPT | Mod: LT

## 2023-08-23 ENCOUNTER — TELEPHONE (OUTPATIENT)
Dept: ORTHOPEDICS | Facility: CLINIC | Age: 53
End: 2023-08-23
Payer: COMMERCIAL

## 2023-08-23 DIAGNOSIS — S62.621S: Primary | ICD-10-CM

## 2023-08-23 DIAGNOSIS — M79.645 FINGER PAIN, LEFT: ICD-10-CM

## 2023-08-23 NOTE — RESULT ENCOUNTER NOTE
Kit Wood,    Here are the results of your recent MRI.  Have had a chance to look at them.  The old concerns for fracture overall healed.  He has some mild swelling in the middle phalanx of the index finger, but overall the tendons and ligaments look okay.  There is some soft tissue swelling in that area.  Overall, at this point, I think it would be reasonable for you to embark on hand therapy given the need for continued dexterity given the instrumentation.  Let me know if you like me to place this order for hand therapy to get your range of motion back and get you feeling a little bit better.    Regards,  Rupesh Handley, DO

## 2023-08-23 NOTE — TELEPHONE ENCOUNTER
[facility] Call Center    Phone Message    May a detailed message be left on voicemail: yes     Reason for Call: Other: Patient is requesting phone call to go over MRI results.     Action Taken: Message routed to:  [facility] ([facility]): sports    Travel Screening: Not Applicable

## 2023-08-28 NOTE — TELEPHONE ENCOUNTER
Called and informed Israel of the message that Dr. Handley sent to Israel via [employer]. He had not viewed this yet. I read the message to him and he had a few questions regarding what exactly is wrong with his finger. I informed him that he should reply to Dr. Handleys [employer] message with whatever questions he has about his specific diagnosis. He understood. I placed an hand therapy referral per Dr. Handley and gave Israel the scheduling number to call.          Socrates Storey M.A., LAT, ATC  Certified Athletic Trainer

## 2023-08-28 NOTE — TELEPHONE ENCOUNTER
[facility] Call Center    Phone Message    May a detailed message be left on voicemail: yes     Reason for Call: Other: Pt is waiting to hear from clinic and Dr. Handley regarding his MRI results from August 20th. Please call patient review results ASAP. Thanks!     Action Taken: Routed to [employer]    Travel Screening: Not Applicable

## 2023-09-15 ENCOUNTER — THERAPY VISIT (OUTPATIENT)
Dept: OCCUPATIONAL THERAPY | Facility: CLINIC | Age: 53
End: 2023-09-15
Attending: STUDENT IN AN ORGANIZED HEALTH CARE EDUCATION/TRAINING PROGRAM
Payer: COMMERCIAL

## 2023-09-15 DIAGNOSIS — M79.645 FINGER PAIN, LEFT: ICD-10-CM

## 2023-09-15 DIAGNOSIS — S62.621S: ICD-10-CM

## 2023-09-15 PROCEDURE — 97165 OT EVAL LOW COMPLEX 30 MIN: CPT | Mod: GO | Performed by: OCCUPATIONAL THERAPIST

## 2023-09-15 PROCEDURE — 97530 THERAPEUTIC ACTIVITIES: CPT | Mod: GO | Performed by: OCCUPATIONAL THERAPIST

## 2023-09-15 PROCEDURE — 97760 ORTHOTIC MGMT&TRAING 1ST ENC: CPT | Mod: GO | Performed by: OCCUPATIONAL THERAPIST

## 2023-09-15 PROCEDURE — 97110 THERAPEUTIC EXERCISES: CPT | Mod: GO | Performed by: OCCUPATIONAL THERAPIST

## 2023-09-15 PROCEDURE — 97140 MANUAL THERAPY 1/> REGIONS: CPT | Mod: GO | Performed by: OCCUPATIONAL THERAPIST
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
## 2023-09-28 ENCOUNTER — THERAPY VISIT (OUTPATIENT)
Dept: OCCUPATIONAL THERAPY | Facility: CLINIC | Age: 53
End: 2023-09-28
Attending: STUDENT IN AN ORGANIZED HEALTH CARE EDUCATION/TRAINING PROGRAM
Payer: COMMERCIAL

## 2023-09-28 DIAGNOSIS — M79.645 FINGER PAIN, LEFT: Primary | ICD-10-CM

## 2023-09-28 DIAGNOSIS — S62.621S: ICD-10-CM

## 2023-09-28 PROCEDURE — 97140 MANUAL THERAPY 1/> REGIONS: CPT | Mod: GO | Performed by: OCCUPATIONAL THERAPIST

## 2023-09-28 PROCEDURE — 97110 THERAPEUTIC EXERCISES: CPT | Mod: GO | Performed by: OCCUPATIONAL THERAPIST

## 2023-11-02 ENCOUNTER — THERAPY VISIT (OUTPATIENT)
Dept: OCCUPATIONAL THERAPY | Facility: CLINIC | Age: 53
End: 2023-11-02
Payer: COMMERCIAL

## 2023-11-02 DIAGNOSIS — M79.645 FINGER PAIN, LEFT: Primary | ICD-10-CM

## 2023-11-02 DIAGNOSIS — S62.621S: ICD-10-CM

## 2023-11-02 PROCEDURE — 97760 ORTHOTIC MGMT&TRAING 1ST ENC: CPT | Mod: GO | Performed by: OCCUPATIONAL THERAPIST

## 2023-11-02 PROCEDURE — 97110 THERAPEUTIC EXERCISES: CPT | Mod: GO | Performed by: OCCUPATIONAL THERAPIST

## 2024-03-29 PROBLEM — M79.645 FINGER PAIN, LEFT: Status: RESOLVED | Noted: 2023-09-15 | Resolved: 2024-03-29

## 2024-03-29 PROBLEM — S62.621S: Status: RESOLVED | Noted: 2023-09-15 | Resolved: 2024-03-29

## 2024-06-08 ENCOUNTER — HEALTH MAINTENANCE LETTER (OUTPATIENT)
Age: 54
End: 2024-06-08

## 2024-10-08 ENCOUNTER — OFFICE VISIT (OUTPATIENT)
Dept: URGENT CARE | Facility: URGENT CARE | Age: 54
End: 2024-10-08
Payer: COMMERCIAL

## 2024-10-08 VITALS
HEART RATE: 91 BPM | TEMPERATURE: 97.5 F | OXYGEN SATURATION: 97 % | DIASTOLIC BLOOD PRESSURE: 63 MMHG | SYSTOLIC BLOOD PRESSURE: 117 MMHG

## 2024-10-08 DIAGNOSIS — K04.7 DENTAL INFECTION: Primary | ICD-10-CM

## 2024-10-08 PROCEDURE — 99213 OFFICE O/P EST LOW 20 MIN: CPT | Performed by: PHYSICIAN ASSISTANT

## 2024-10-08 RX ORDER — CLINDAMYCIN HYDROCHLORIDE 300 MG/1
300 CAPSULE ORAL 3 TIMES DAILY
Qty: 30 CAPSULE | Refills: 0 | Status: SHIPPED | OUTPATIENT
Start: 2024-10-08 | End: 2024-10-18
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
## 2025-06-15 ENCOUNTER — HEALTH MAINTENANCE LETTER (OUTPATIENT)
Age: 55
End: 2025-06-15